# Patient Record
Sex: FEMALE | Race: OTHER
[De-identification: names, ages, dates, MRNs, and addresses within clinical notes are randomized per-mention and may not be internally consistent; named-entity substitution may affect disease eponyms.]

---

## 2018-05-28 ENCOUNTER — HOSPITAL ENCOUNTER (EMERGENCY)
Dept: HOSPITAL 62 - ER | Age: 63
Discharge: HOME | End: 2018-05-28
Payer: SELF-PAY

## 2018-05-28 VITALS — SYSTOLIC BLOOD PRESSURE: 121 MMHG | DIASTOLIC BLOOD PRESSURE: 84 MMHG

## 2018-05-28 DIAGNOSIS — D72.829: Primary | ICD-10-CM

## 2018-05-28 DIAGNOSIS — K57.30: ICD-10-CM

## 2018-05-28 DIAGNOSIS — R11.0: ICD-10-CM

## 2018-05-28 DIAGNOSIS — R10.32: ICD-10-CM

## 2018-05-28 LAB
ADD MANUAL DIFF: NO
ALBUMIN SERPL-MCNC: 4.4 G/DL (ref 3.5–5)
ALP SERPL-CCNC: 77 U/L (ref 38–126)
ALT SERPL-CCNC: 24 U/L (ref 9–52)
ANION GAP SERPL CALC-SCNC: 10 MMOL/L (ref 5–19)
APPEARANCE UR: (no result)
APTT PPP: (no result) S
AST SERPL-CCNC: 24 U/L (ref 14–36)
BASOPHILS # BLD AUTO: 0 10^3/UL (ref 0–0.2)
BASOPHILS NFR BLD AUTO: 0.2 % (ref 0–2)
BILIRUB DIRECT SERPL-MCNC: 0.3 MG/DL (ref 0–0.4)
BILIRUB SERPL-MCNC: 1.1 MG/DL (ref 0.2–1.3)
BILIRUB UR QL STRIP: NEGATIVE
BUN SERPL-MCNC: 14 MG/DL (ref 7–20)
CALCIUM: 9.6 MG/DL (ref 8.4–10.2)
CHLORIDE SERPL-SCNC: 105 MMOL/L (ref 98–107)
CO2 SERPL-SCNC: 28 MMOL/L (ref 22–30)
EOSINOPHIL # BLD AUTO: 0.1 10^3/UL (ref 0–0.6)
EOSINOPHIL NFR BLD AUTO: 0.4 % (ref 0–6)
ERYTHROCYTE [DISTWIDTH] IN BLOOD BY AUTOMATED COUNT: 13.9 % (ref 11.5–14)
GLUCOSE SERPL-MCNC: 100 MG/DL (ref 75–110)
GLUCOSE UR STRIP-MCNC: NEGATIVE MG/DL
HCT VFR BLD CALC: 44.9 % (ref 36–47)
HGB BLD-MCNC: 15.5 G/DL (ref 12–15.5)
KETONES UR STRIP-MCNC: NEGATIVE MG/DL
LIPASE SERPL-CCNC: 65.7 U/L (ref 23–300)
LYMPHOCYTES # BLD AUTO: 1.4 10^3/UL (ref 0.5–4.7)
LYMPHOCYTES NFR BLD AUTO: 11.2 % (ref 13–45)
MCH RBC QN AUTO: 31.3 PG (ref 27–33.4)
MCHC RBC AUTO-ENTMCNC: 34.5 G/DL (ref 32–36)
MCV RBC AUTO: 91 FL (ref 80–97)
MONOCYTES # BLD AUTO: 0.7 10^3/UL (ref 0.1–1.4)
MONOCYTES NFR BLD AUTO: 5.6 % (ref 3–13)
NEUTROPHILS # BLD AUTO: 10.6 10^3/UL (ref 1.7–8.2)
NEUTS SEG NFR BLD AUTO: 82.6 % (ref 42–78)
NITRITE UR QL STRIP: NEGATIVE
PH UR STRIP: 5 [PH] (ref 5–9)
PLATELET # BLD: 216 10^3/UL (ref 150–450)
POTASSIUM SERPL-SCNC: 4.1 MMOL/L (ref 3.6–5)
PROT SERPL-MCNC: 7.4 G/DL (ref 6.3–8.2)
PROT UR STRIP-MCNC: NEGATIVE MG/DL
RBC # BLD AUTO: 4.95 10^6/UL (ref 3.72–5.28)
SODIUM SERPL-SCNC: 143.3 MMOL/L (ref 137–145)
SP GR UR STRIP: 1.02
TOTAL CELLS COUNTED % (AUTO): 100 %
UROBILINOGEN UR-MCNC: 2 MG/DL (ref ?–2)
WBC # BLD AUTO: 12.9 10^3/UL (ref 4–10.5)

## 2018-05-28 PROCEDURE — 83690 ASSAY OF LIPASE: CPT

## 2018-05-28 PROCEDURE — 80053 COMPREHEN METABOLIC PANEL: CPT

## 2018-05-28 PROCEDURE — 96374 THER/PROPH/DIAG INJ IV PUSH: CPT

## 2018-05-28 PROCEDURE — 85025 COMPLETE CBC W/AUTO DIFF WBC: CPT

## 2018-05-28 PROCEDURE — 96375 TX/PRO/DX INJ NEW DRUG ADDON: CPT

## 2018-05-28 PROCEDURE — 36415 COLL VENOUS BLD VENIPUNCTURE: CPT

## 2018-05-28 PROCEDURE — 74177 CT ABD & PELVIS W/CONTRAST: CPT

## 2018-05-28 PROCEDURE — 81001 URINALYSIS AUTO W/SCOPE: CPT

## 2018-05-28 PROCEDURE — 96361 HYDRATE IV INFUSION ADD-ON: CPT

## 2018-05-28 PROCEDURE — 99284 EMERGENCY DEPT VISIT MOD MDM: CPT

## 2018-05-28 NOTE — ER DOCUMENT REPORT
ED Medical Screen (RME)





- General


Chief Complaint: Abdominal Pain


Stated Complaint: ABDOMINAL PAIN


Time Seen by Provider: 05/28/18 05:06


Notes: 


Patient is a 62-year-old female comes emergency department for chief complaint 

of lower abdominal pain, pain started yesterday, intermittently it is very 

sharp and painful, she states that she can feel it around to her back on both 

sides but not in any particular area of her back.  She reports nausea but 

denies vomiting.  She has been able to eat.  She states her bowel movements 

over the past couple days have been "pencil thin".  She has had an endoscopy in 

the past which showed an abnormal growth which was "shaved off", patient has 

not been back to gastroenterology and cannot tell me anymore details in regards 

to this.  No fever or chills.  No abdominal surgeries.





TRAVEL OUTSIDE OF THE [place] IN LAST 30 DAYS: No





- Related Data


Allergies/Adverse Reactions: 


 





No Known Allergies Allergy (Unverified 05/28/18 04:50)


 











Physical Exam





- Vital signs


Vitals: 





 











Temp Pulse Resp BP Pulse Ox


 


 98.7 F   94   20   151/80 H  100 


 


 05/28/18 04:50  05/28/18 04:50  05/28/18 04:50  05/28/18 04:50  05/28/18 04:50














- General


General appearance: Anxious


In distress: Mild - Patient appears to be in some pain





- Abdominal


Tenderness: Tender - Generalized lower abdominal tenderness, nonspecific, no 

overt guarding, no rigidity





Course





- Re-evaluation


Re-evalutation: 


Generalized lower abdominal tenderness, patient appears to be in pain, pain 

radiates around the back.  Good distal pulses.  Laboratory workup pending.





- Vital Signs


Vital signs: 





 











Temp Pulse Resp BP Pulse Ox


 


 98.7 F   94   20   151/80 H  100 


 


 05/28/18 04:50  05/28/18 04:50  05/28/18 04:50  05/28/18 04:50  05/28/18 04:50

## 2018-05-28 NOTE — ER DOCUMENT REPORT
ED General





- General


Chief Complaint: Abdominal Pain


Stated Complaint: ABDOMINAL PAIN


Time Seen by Provider: 05/28/18 05:06


TRAVEL OUTSIDE OF THE [place] IN LAST 30 DAYS: No





- HPI


Notes: 





Patient is a 62-year-old female comes emergency department for chief complaint 

of lower abdominal pain, pain started yesterday, intermittently it is very 

sharp and painful, she states that she can feel it around to her back on both 

sides but not in any particular area of her back.  She reports nausea but 

denies vomiting.  She has been able to eat.  She states her bowel movements 

over the past couple days have been "pencil thin".  She has had an endoscopy in 

the past which showed an abnormal growth which was "shaved off", patient has 

not been back to gastroenterology and cannot tell me anymore details in regards 

to this.  No fever or chills.  No abdominal surgeries.





- Related Data


Allergies/Adverse Reactions: 


 





No Known Allergies Allergy (Unverified 05/28/18 04:50)


 











Past Medical History





- Social History


Smoking Status: Unknown if Ever Smoked


Family History: None


Patient has suicidal ideation: No


Patient has homicidal ideation: No


Renal/ Medical History: Denies: Hx Peritoneal Dialysis





Review of Systems





- Review of Systems


Notes: 





REVIEW OF SYSTEMS:


CONSTITUTIONAL: -fevers, -chills


EENT: -eye pain, -difficulty swallowing, -nasal congestion


CARDIOVASCULAR: -chest pain, -syncope.


RESPIRATORY: -cough, -SOB


GASTROINTESTINAL:   + abdominal pain, -nausea, -vomiting, -diarrhea


GENITOURINARY: -dysuria, -hematuria


MUSCULOSKELETAL: -back pain, -neck pain


SKIN: -rash or skin lesions.


HEMATOLOGIC: -easy bruising or bleeding.


LYMPHATIC: -swollen, enlarged glands.


NEUROLOGICAL: -altered mental status or loss of consciousness, -headache, -

neurologic symptoms


PSYCHIATRIC: -anxiety, -depression.


ALL OTHER SYSTEMS REVIEWED AND NEGATIVE.





Physical Exam





- Vital signs


Vitals: 


 











Temp Pulse Resp BP Pulse Ox


 


 98.7 F   94   20   151/80 H  100 


 


 05/28/18 04:50  05/28/18 04:50  05/28/18 04:50  05/28/18 04:50  05/28/18 04:50














- Notes


Notes: 





PHYSICAL EXAMINATION:


GENERAL: Well-appearing, well-nourished and in no acute distress.


HEAD: Atraumatic, normocephalic.


EYES: Pupils equal round and reactive to light, extraocular movements intact, 

sclera anicteric, conjunctiva are normal.


ENT: nares patent, oropharynx clear without exudates.  Moist mucous membranes.


NECK: Normal range of motion, supple without lymphadenopathy


LUNGS: Breath sounds clear to auscultation bilaterally and equal.  No wheezes 

rales or rhonchi.


HEART: Regular rate and rhythm without murmurs


ABDOMEN: Soft. LLQ tenderness


EXTREMITIES: Normal range of motion, no pitting or edema.  No cyanosis.


NEUROLOGICAL: Cranial nerves grossly intact.  Normal speech, normal gait.  

Normal sensory and motor exams.


PSYCH: Normal mood, normal affect.


SKIN: Warm, Dry, normal turgor, no rashes or lesions noted.





Course





- Re-evaluation


Re-evalutation: 





05/28/18 07:22


Pleasant female presents with pain left lower quadrant diffuse in nature.  

Patient's has a mild leukocytosis.  Patient tolerating oral intake.  Given 

opioid pain medicine along with fluid resuscitation in the urgency department.  

Patient's CAT scan abdomen and pelvis finds diverticulitis with some fat 

stranding.  No signs of abscess or fluid pockets.  Patient be discharged home 

with oral antibiotics, oral opioid therapy, oral emetics.  Follow-up with PCP.





- Vital Signs


Vital signs: 


 











Temp Pulse Resp BP Pulse Ox


 


 98.7 F   94   11 L  133/105 H  99 


 


 05/28/18 04:50  05/28/18 04:50  05/28/18 07:00  05/28/18 06:01  05/28/18 07:00














- Laboratory


Result Diagrams: 


 05/28/18 05:35





 05/28/18 05:35


Laboratory results interpreted by me: 


 











  05/28/18 05/28/18





  05:35 06:18


 


WBC  12.9 H 


 


Seg Neutrophils %  82.6 H 


 


Lymphocytes %  11.2 L 


 


Absolute Neutrophils  10.6 H 


 


Urine Blood   LARGE H


 


Urine Urobilinogen   2.0 H














Discharge





- Discharge


Clinical Impression: 


 Diverticula of colon





Condition: Stable


Disposition: HOME, SELF-CARE


Instructions:  Diverticulitis ([facility])


Prescriptions: 


Amox Tr/Potassium Clavulanate [Augmentin 875-125 Tablet] 1 tab PO BID 10 Days  

tablet


Ondansetron [Zofran Odt 4 mg Tablet] 1 - 2 tab PO Q4H PRN #15 tab.rapdis


 PRN Reason: For Nausea/Vomiting


Oxycodone HCl [Oxycodone HCl 10 MG Tablet] 1 - 2 tab PO Q6H PRN #15 tablet


 PRN Reason: PAIN


Referrals: 


HARLEY YANG DO [NO LOCAL MD] - Follow up as needed

## 2018-05-28 NOTE — RADIOLOGY REPORT (SQ)
EXAM DESCRIPTION:

CT ABDOMEN PELVIS WITH IV CONTRAST



CLINICAL HISTORY:

62 years Female, abdominal pain



Comparison: None.



Technique:  IV contrast.  Coronal and sagittal reformat.  This

exam was performed according to our departmental

dose-optimization program, which includes automated exposure

control, adjustment of the mA and/or kV according to patient size

and/or use of iterative reconstruction technique.[facility]: Dose Right

[facility]: CareDose   [facility]: Dose Right    [facility]: Teradose 4D    [facility]:

[employer]



LIMITATIONS:

None.



Findings: 



Moderate sigmoid diverticulitis includes moderate fat

inflammation left paracentral to mid sigmoid. No free fluid. No

abscess. Normal appendix.



Calcified splenic and hepatic granulomata. Atherosclerosis.

Normal appendix. Inferior thorax, liver, gallbladder, pancreas,

spleen, adrenals, renal system, pelvic organs, lymphatics,

vasculature, and musculoskeleton appear otherwise unremarkable.  





IMPRESSION:



Moderate sigmoid diverticulitis. No abscess.